# Patient Record
Sex: MALE | Race: OTHER | Employment: FULL TIME | URBAN - METROPOLITAN AREA
[De-identification: names, ages, dates, MRNs, and addresses within clinical notes are randomized per-mention and may not be internally consistent; named-entity substitution may affect disease eponyms.]

---

## 2018-02-28 ENCOUNTER — HOSPITAL ENCOUNTER (OUTPATIENT)
Dept: CT IMAGING | Age: 38
Discharge: HOME | End: 2018-02-28
Payer: SELF-PAY

## 2018-02-28 DIAGNOSIS — Z13.6 SCREENING FOR ISCHEMIC HEART DISEASE: ICD-10-CM

## 2018-02-28 PROCEDURE — 75571 CT HRT W/O DYE W/CA TEST: CPT

## 2018-03-06 ENCOUNTER — TELEPHONE (OUTPATIENT)
Dept: CARDIAC REHAB | Age: 38
End: 2018-03-06

## 2018-03-06 NOTE — TELEPHONE ENCOUNTER
Patient returned the call that was left for him earlier to review his CT scan results. Verified patient's date of birth. Discussed results of CT scan. His calcium score is 0. This corresponds to no plaque noted in the coronary arteries. His risk for a heart attack is very low. The chance of patient developing heart disease at this point is less than 1%. Patient verbalized understanding of results. Will fax report to his/her PCP, Marianna Marcum.